# Patient Record
Sex: FEMALE | URBAN - METROPOLITAN AREA
[De-identification: names, ages, dates, MRNs, and addresses within clinical notes are randomized per-mention and may not be internally consistent; named-entity substitution may affect disease eponyms.]

---

## 2017-09-28 ENCOUNTER — APPOINTMENT (RX ONLY)
Dept: URBAN - METROPOLITAN AREA OTHER 12 | Facility: OTHER | Age: 52
Setting detail: DERMATOLOGY
End: 2017-09-28

## 2017-09-28 DIAGNOSIS — D485 NEOPLASM OF UNCERTAIN BEHAVIOR OF SKIN: ICD-10-CM

## 2017-09-28 DIAGNOSIS — H01.13 ECZEMATOUS DERMATITIS OF EYELID: ICD-10-CM

## 2017-09-28 PROBLEM — D48.5 NEOPLASM OF UNCERTAIN BEHAVIOR OF SKIN: Status: ACTIVE | Noted: 2017-09-28

## 2017-09-28 PROBLEM — H01.139 ECZEMATOUS DERMATITIS OF UNSPECIFIED EYE, UNSPECIFIED EYELID: Status: ACTIVE | Noted: 2017-09-28

## 2017-09-28 PROCEDURE — ? COUNSELING

## 2017-09-28 PROCEDURE — ? BIOPSY BY SHAVE METHOD

## 2017-09-28 PROCEDURE — ? TREATMENT REGIMEN

## 2017-09-28 PROCEDURE — 11100: CPT

## 2017-09-28 PROCEDURE — ? PRESCRIPTION

## 2017-09-28 PROCEDURE — 99213 OFFICE O/P EST LOW 20 MIN: CPT | Mod: 25

## 2017-09-28 RX ORDER — HYDROCORTISONE 25 MG/G
OINTMENT TOPICAL
Qty: 2 | Refills: 1 | Status: ERX | COMMUNITY
Start: 2017-09-28

## 2017-09-28 RX ORDER — TACROLIMUS 1 MG/G
OINTMENT TOPICAL
Qty: 1 | Refills: 2 | Status: ERX | COMMUNITY
Start: 2017-09-28

## 2017-09-28 RX ADMIN — HYDROCORTISONE: 25 OINTMENT TOPICAL at 23:38

## 2017-09-28 RX ADMIN — TACROLIMUS: 1 OINTMENT TOPICAL at 23:38

## 2017-09-28 ASSESSMENT — LOCATION ZONE DERM: LOCATION ZONE: NOSE

## 2017-09-28 ASSESSMENT — LOCATION SIMPLE DESCRIPTION DERM: LOCATION SIMPLE: NOSE

## 2017-09-28 ASSESSMENT — LOCATION DETAILED DESCRIPTION DERM: LOCATION DETAILED: NASAL TIP

## 2017-09-28 NOTE — PROCEDURE: BIOPSY BY SHAVE METHOD
Billing Type: Third-Party Bill
Bill For Surgical Tray: no
Additional Anesthesia Volume In Cc (Will Not Render If 0): 0
Type Of Destruction Used: Curettage
Consent: Written consent was obtained and risks were reviewed including but not limited to scarring, infection, bleeding, scabbing, incomplete removal, nerve damage and allergy to anesthesia.
Hemostasis: Drysol
Curettage Text: The wound bed was treated with curettage after the biopsy was performed.
Cryotherapy Text: The wound bed was treated with cryotherapy after the biopsy was performed.
Dressing: bandage
Electrodesiccation Text: The wound bed was treated with electrodesiccation after the biopsy was performed.
Wound Care: Bacitracin
Lab Facility: [id number]
Post-Care Instructions: I reviewed with the patient in detail post-care instructions. Patient is to keep the biopsy site dry overnight, and then apply bacitracin twice daily until healed. Patient may apply hydrogen peroxide soaks to remove any crusting.
Silver Nitrate Text: The wound bed was treated with silver nitrate after the biopsy was performed.
Lab: [id number]
Detail Level: Detailed
Electrodesiccation And Curettage Text: The wound bed was treated with electrodesiccation and curettage after the biopsy was performed.
Biopsy Type: H and E
Anesthesia Type: 1% lidocaine with epinephrine
Biopsy Method: Dermablade
Anesthesia Volume In Cc: 0.5
Notification Instructions: Patient will be notified of biopsy results. However, patient instructed to call the office if not contacted within 2 weeks.

## 2017-09-28 NOTE — PROCEDURE: TREATMENT REGIMEN
Discontinue Regimen: Elidel cream
Initiate Treatment: Hydrocortisone 2.5% ointment
Detail Level: Zone

## 2018-09-13 ENCOUNTER — RX ONLY (OUTPATIENT)
Age: 53
Setting detail: RX ONLY
End: 2018-09-13

## 2018-09-13 RX ORDER — CEPHALEXIN 500 MG/1
CAPSULE ORAL
Qty: 3 | Refills: 0 | Status: ERX | COMMUNITY
Start: 2018-09-13